# Patient Record
Sex: MALE | Race: OTHER | URBAN - METROPOLITAN AREA
[De-identification: names, ages, dates, MRNs, and addresses within clinical notes are randomized per-mention and may not be internally consistent; named-entity substitution may affect disease eponyms.]

---

## 2019-08-09 ENCOUNTER — INPATIENT (INPATIENT)
Facility: HOSPITAL | Age: 84
LOS: 1 days | Discharge: HOME | End: 2019-08-11
Attending: INTERNAL MEDICINE | Admitting: INTERNAL MEDICINE
Payer: MEDICARE

## 2019-08-09 VITALS
DIASTOLIC BLOOD PRESSURE: 82 MMHG | HEART RATE: 69 BPM | TEMPERATURE: 98 F | RESPIRATION RATE: 22 BRPM | OXYGEN SATURATION: 100 % | SYSTOLIC BLOOD PRESSURE: 146 MMHG

## 2019-08-09 DIAGNOSIS — Z87.39 PERSONAL HISTORY OF OTHER DISEASES OF THE MUSCULOSKELETAL SYSTEM AND CONNECTIVE TISSUE: Chronic | ICD-10-CM

## 2019-08-09 LAB
ALBUMIN SERPL ELPH-MCNC: 3.5 G/DL — SIGNIFICANT CHANGE UP (ref 3.5–5.2)
ALP SERPL-CCNC: 61 U/L — SIGNIFICANT CHANGE UP (ref 30–115)
ALT FLD-CCNC: 24 U/L — SIGNIFICANT CHANGE UP (ref 0–41)
ANION GAP SERPL CALC-SCNC: 18 MMOL/L — HIGH (ref 7–14)
AST SERPL-CCNC: 34 U/L — SIGNIFICANT CHANGE UP (ref 0–41)
BILIRUB SERPL-MCNC: 0.5 MG/DL — SIGNIFICANT CHANGE UP (ref 0.2–1.2)
BUN SERPL-MCNC: 15 MG/DL — SIGNIFICANT CHANGE UP (ref 10–20)
CALCIUM SERPL-MCNC: 8 MG/DL — LOW (ref 8.5–10.1)
CHLORIDE SERPL-SCNC: 108 MMOL/L — SIGNIFICANT CHANGE UP (ref 98–110)
CO2 SERPL-SCNC: 15 MMOL/L — LOW (ref 17–32)
CREAT SERPL-MCNC: 1 MG/DL — SIGNIFICANT CHANGE UP (ref 0.7–1.5)
GLUCOSE SERPL-MCNC: 141 MG/DL — HIGH (ref 70–99)
HCT VFR BLD CALC: 40 % — LOW (ref 42–52)
HGB BLD-MCNC: 13.5 G/DL — LOW (ref 14–18)
MAGNESIUM SERPL-MCNC: 1.8 MG/DL — SIGNIFICANT CHANGE UP (ref 1.8–2.4)
MCHC RBC-ENTMCNC: 30.4 PG — SIGNIFICANT CHANGE UP (ref 27–31)
MCHC RBC-ENTMCNC: 33.8 G/DL — SIGNIFICANT CHANGE UP (ref 32–37)
MCV RBC AUTO: 90.1 FL — SIGNIFICANT CHANGE UP (ref 80–94)
NRBC # BLD: 0 /100 WBCS — SIGNIFICANT CHANGE UP (ref 0–0)
NT-PROBNP SERPL-SCNC: 129 PG/ML — SIGNIFICANT CHANGE UP (ref 0–300)
PLATELET # BLD AUTO: 130 K/UL — SIGNIFICANT CHANGE UP (ref 130–400)
POTASSIUM SERPL-MCNC: 4 MMOL/L — SIGNIFICANT CHANGE UP (ref 3.5–5)
POTASSIUM SERPL-SCNC: 4 MMOL/L — SIGNIFICANT CHANGE UP (ref 3.5–5)
PROT SERPL-MCNC: 6.4 G/DL — SIGNIFICANT CHANGE UP (ref 6–8)
RBC # BLD: 4.44 M/UL — LOW (ref 4.7–6.1)
RBC # FLD: 14.7 % — HIGH (ref 11.5–14.5)
SODIUM SERPL-SCNC: 141 MMOL/L — SIGNIFICANT CHANGE UP (ref 135–146)
TROPONIN T SERPL-MCNC: <0.01 NG/ML — SIGNIFICANT CHANGE UP
WBC # BLD: 4.97 K/UL — SIGNIFICANT CHANGE UP (ref 4.8–10.8)
WBC # FLD AUTO: 4.97 K/UL — SIGNIFICANT CHANGE UP (ref 4.8–10.8)

## 2019-08-09 PROCEDURE — 99222 1ST HOSP IP/OBS MODERATE 55: CPT | Mod: 25

## 2019-08-09 PROCEDURE — 92941 PRQ TRLML REVSC TOT OCCL AMI: CPT | Mod: LD

## 2019-08-09 PROCEDURE — 99291 CRITICAL CARE FIRST HOUR: CPT

## 2019-08-09 PROCEDURE — 93458 L HRT ARTERY/VENTRICLE ANGIO: CPT | Mod: 26,XU

## 2019-08-09 PROCEDURE — 93010 ELECTROCARDIOGRAM REPORT: CPT

## 2019-08-09 RX ORDER — RANITIDINE HYDROCHLORIDE 150 MG/1
1 TABLET, FILM COATED ORAL
Qty: 0 | Refills: 0 | DISCHARGE

## 2019-08-09 RX ORDER — ACETAMINOPHEN 500 MG
650 TABLET ORAL EVERY 6 HOURS
Refills: 0 | Status: DISCONTINUED | OUTPATIENT
Start: 2019-08-09 | End: 2019-08-11

## 2019-08-09 RX ORDER — AMLODIPINE BESYLATE 2.5 MG/1
5 TABLET ORAL DAILY
Refills: 0 | Status: DISCONTINUED | OUTPATIENT
Start: 2019-08-09 | End: 2019-08-11

## 2019-08-09 RX ORDER — MORPHINE SULFATE 50 MG/1
2 CAPSULE, EXTENDED RELEASE ORAL ONCE
Refills: 0 | Status: DISCONTINUED | OUTPATIENT
Start: 2019-08-09 | End: 2019-08-09

## 2019-08-09 RX ORDER — PANTOPRAZOLE SODIUM 20 MG/1
40 TABLET, DELAYED RELEASE ORAL
Refills: 0 | Status: DISCONTINUED | OUTPATIENT
Start: 2019-08-09 | End: 2019-08-11

## 2019-08-09 RX ORDER — ALFUZOSIN HYDROCHLORIDE 10 MG/1
1 TABLET, EXTENDED RELEASE ORAL
Qty: 0 | Refills: 0 | DISCHARGE

## 2019-08-09 RX ORDER — METOPROLOL TARTRATE 50 MG
25 TABLET ORAL DAILY
Refills: 0 | Status: DISCONTINUED | OUTPATIENT
Start: 2019-08-09 | End: 2019-08-11

## 2019-08-09 RX ORDER — ATORVASTATIN CALCIUM 80 MG/1
80 TABLET, FILM COATED ORAL AT BEDTIME
Refills: 0 | Status: DISCONTINUED | OUTPATIENT
Start: 2019-08-09 | End: 2019-08-11

## 2019-08-09 RX ORDER — SODIUM CHLORIDE 9 MG/ML
750 INJECTION INTRAMUSCULAR; INTRAVENOUS; SUBCUTANEOUS
Refills: 0 | Status: DISCONTINUED | OUTPATIENT
Start: 2019-08-09 | End: 2019-08-11

## 2019-08-09 RX ORDER — TICAGRELOR 90 MG/1
180 TABLET ORAL ONCE
Refills: 0 | Status: COMPLETED | OUTPATIENT
Start: 2019-08-09 | End: 2019-08-09

## 2019-08-09 RX ORDER — LEVOTHYROXINE SODIUM 125 MCG
175 TABLET ORAL DAILY
Refills: 0 | Status: DISCONTINUED | OUTPATIENT
Start: 2019-08-09 | End: 2019-08-11

## 2019-08-09 RX ORDER — TICAGRELOR 90 MG/1
90 TABLET ORAL EVERY 12 HOURS
Refills: 0 | Status: DISCONTINUED | OUTPATIENT
Start: 2019-08-09 | End: 2019-08-11

## 2019-08-09 RX ORDER — ENOXAPARIN SODIUM 100 MG/ML
40 INJECTION SUBCUTANEOUS AT BEDTIME
Refills: 0 | Status: DISCONTINUED | OUTPATIENT
Start: 2019-08-10 | End: 2019-08-11

## 2019-08-09 RX ORDER — LISINOPRIL 2.5 MG/1
40 TABLET ORAL DAILY
Refills: 0 | Status: DISCONTINUED | OUTPATIENT
Start: 2019-08-09 | End: 2019-08-11

## 2019-08-09 RX ORDER — ASPIRIN/CALCIUM CARB/MAGNESIUM 324 MG
325 TABLET ORAL ONCE
Refills: 0 | Status: COMPLETED | OUTPATIENT
Start: 2019-08-09 | End: 2019-08-09

## 2019-08-09 RX ORDER — ASPIRIN/CALCIUM CARB/MAGNESIUM 324 MG
81 TABLET ORAL DAILY
Refills: 0 | Status: DISCONTINUED | OUTPATIENT
Start: 2019-08-09 | End: 2019-08-11

## 2019-08-09 RX ORDER — TICAGRELOR 90 MG/1
1 TABLET ORAL
Qty: 60 | Refills: 0
Start: 2019-08-09 | End: 2019-09-07

## 2019-08-09 RX ORDER — ENOXAPARIN SODIUM 100 MG/ML
40 INJECTION SUBCUTANEOUS AT BEDTIME
Refills: 0 | Status: DISCONTINUED | OUTPATIENT
Start: 2019-08-09 | End: 2019-08-09

## 2019-08-09 RX ADMIN — AMLODIPINE BESYLATE 5 MILLIGRAM(S): 2.5 TABLET ORAL at 21:11

## 2019-08-09 RX ADMIN — Medication 25 MILLIGRAM(S): at 20:29

## 2019-08-09 RX ADMIN — TICAGRELOR 90 MILLIGRAM(S): 90 TABLET ORAL at 20:28

## 2019-08-09 RX ADMIN — Medication 175 MICROGRAM(S): at 21:11

## 2019-08-09 RX ADMIN — PANTOPRAZOLE SODIUM 40 MILLIGRAM(S): 20 TABLET, DELAYED RELEASE ORAL at 21:11

## 2019-08-09 RX ADMIN — TICAGRELOR 180 MILLIGRAM(S): 90 TABLET ORAL at 10:23

## 2019-08-09 RX ADMIN — Medication 325 MILLIGRAM(S): at 10:20

## 2019-08-09 RX ADMIN — SODIUM CHLORIDE 75 MILLILITER(S): 9 INJECTION INTRAMUSCULAR; INTRAVENOUS; SUBCUTANEOUS at 12:21

## 2019-08-09 RX ADMIN — LISINOPRIL 40 MILLIGRAM(S): 2.5 TABLET ORAL at 21:11

## 2019-08-09 RX ADMIN — MORPHINE SULFATE 2 MILLIGRAM(S): 50 CAPSULE, EXTENDED RELEASE ORAL at 14:37

## 2019-08-09 RX ADMIN — ATORVASTATIN CALCIUM 80 MILLIGRAM(S): 80 TABLET, FILM COATED ORAL at 21:11

## 2019-08-09 NOTE — H&P ADULT - ASSESSMENT
83 y/o M w/ PMH of HTN, GERD, Melanoma, BPH, chronic back pain, and Pulmonary Fibrosis BIBEMS for chest pain. Code STEMI was called. Pt went to the cath lab. PCI showed 100% stenosis in LAD and was stented.     STEMI   - s/p PCI w/ LAD stent  - 83 y/o M w/ PMH of HTN, GERD, Melanoma, BPH, chronic back pain BIBEMS for chest pain. EKG showed ÁLVARO in V1-V3. Code STEMI was called. Pt went to the cath lab. PCI showed 100% stenosis in LAD and was stented.     STEMI   - admitted to CCU   - s/p ASA 325x1 and Brilinta 180x1   - s/p PCI w/ LAD stent  - Cardio follow   - c/w ASA, Brilinta, high dose Statin, BetaBlocker  - Daily EKG   - Monitor cath site R groin  - ordered ECHO   - Possible Staged PCI on monday   - NPO Sunday night     HTN   - keep systolic -150   - c/w norvasc   - c/w lisinopril     DLD  - c/w statin     GERD  - protonix bid     BPH   - home med not available   - currently urinating   - consider flomax if needed    Melanoma  - in remission   - pt takes herbal supplement bought online     DVT ppx; start lovenox tomorrow   GI ppx; protonix  Activity; Increase as tolerated  Diet; DASH   Dispo; CCU monitoring   FULL CODE

## 2019-08-09 NOTE — CONSULT NOTE ADULT - SUBJECTIVE AND OBJECTIVE BOX
Chief complaint:  Chest Pain    HPI:  85 yo M h/o HTN, melanoma has been c/o substernal cgest pain that started this AM, 20 mins prior to EMS arrival, brought to ER found to have anterior wall MI, STEMI activated, pt brought to cath lab for emergent PCI    ROS:  Constitutional: No fever, chill, sweats  Eye: No recent visual problem  ENMT: No ear pain, nasal congestion, throat pain  Respiratoty: No SOB, cough  Cardiovascular: + chest pain, no palpitaion, no syncope  Gastrointestinal: No nausea, vomitting, diarhea  Genitourinary: No dysuria, hematuria  Heam/Lymp: No brusing tendency, no swollen glands  Endocrine: Negative for excessive hunger, thirst  Musculoskeletal: No neck pain, back pain, joint pain  Intergumentory: No rash, skin lesions  Neurologic: alert and oriented    PAST MEDICAL & SURGICAL HISTORY  HTN    FAMILY HISTORY:  FAMILY HISTORY:  NC    SOCIAL HISTORY:  [-]smoker  [-]Alcohol  [-]Drug    ALLERGIES:  No Known Allergies      MEDICATIONS:  MEDICATIONS  (STANDING):  ticagrelor 180 milliGRAM(s) Oral once    MEDICATIONS  (PRN):      HOME MEDICATIONS:  Home Medications:      VITALS:   T(F): 97.7 (08-09 @ 10:28), Max: 97.7 (08-09 @ 10:17)  HR: 69 (08-09 @ 10:28) (69 - 69)  BP: 146/82 (08-09 @ 10:28) (146/82 - 146/82)  BP(mean): --  RR: 22 (08-09 @ 10:28) (22 - 22)  SpO2: 100% (08-09 @ 10:28) (100% - 100%)    I&O's Summary      PHYSICAL EXAM:  GEN: No acute distress  NECK: Supple, non tender, NO JVD, No carotid bruit,   LUNGS: Clear to auscultation bilaterally, non labored respiration  CARDIOVASCULAR: S1/S2 present, RRR , no murmus or rubs  ABD: Soft, non-tender, non-distended,   EXT: No Lower extremity edema, no tenderness  NEURO: Non focal  SKIN: Intact    LABS:                        13.5   4.97  )-----------( 130      ( 09 Aug 2019 10:00 )             40.0       ECG:  NSR, ST elevation in precordial leads Chief complaint:  Chest Pain    HPI:  85 yo M h/o HTN, melanoma has been c/o substernal cgest pain that started this AM, 20 mins prior to EMS arrival, brought to ER found to have anterior wall MI, STEMI activated, pt brought to cath lab for emergent PCI    ROS:  Constitutional: No fever, chill, sweats  Eye: No recent visual problem  ENMT: No ear pain, nasal congestion, throat pain  Respiratoty: No SOB, cough  Cardiovascular: + chest pain, no palpitaion, no syncope  Gastrointestinal: No nausea, vomitting, diarhea  Genitourinary: No dysuria, hematuria  Heam/Lymp: No brusing tendency, no swollen glands  Endocrine: Negative for excessive hunger, thirst  Musculoskeletal: No neck pain, back pain, joint pain  Intergumentory: No rash, skin lesions  Neurologic: alert and oriented    PAST MEDICAL & SURGICAL HISTORY  HTN    FAMILY HISTORY:  FAMILY HISTORY:  NC    SOCIAL HISTORY:  [-]smoker  [-]Alcohol  [-]Drug    ALLERGIES:  No Known Allergies      MEDICATIONS:  MEDICATIONS  (STANDING):  ticagrelor 180 milliGRAM(s) Oral once    MEDICATIONS  (PRN):      HOME MEDICATIONS:  Home Medications:      VITALS:   T(F): 97.7 (08-09 @ 10:28), Max: 97.7 (08-09 @ 10:17)  HR: 69 (08-09 @ 10:28) (69 - 69)  BP: 146/82 (08-09 @ 10:28) (146/82 - 146/82)  BP(mean): --  RR: 22 (08-09 @ 10:28) (22 - 22)  SpO2: 100% (08-09 @ 10:28) (100% - 100%)    I&O's Summary      PHYSICAL EXAM:  GEN: No acute distress  NECK: Supple, non tender, NO JVD, No carotid bruit,   LUNGS: Clear to auscultation bilaterally, non labored respiration  CARDIOVASCULAR: S1/S2 present, RRR , no murmus or rubs  ABD: Soft, non-tender, non-distended,   EXT/MS: No Lower extremity edema, no tenderness  NEURO: Non focal  SKIN: Intact    LABS:                        13.5   4.97  )-----------( 130      ( 09 Aug 2019 10:00 )             40.0       ECG:  NSR, ST elevation in precordial leads

## 2019-08-09 NOTE — H&P ADULT - NSHPLABSRESULTS_GEN_ALL_CORE
13.5   4.97  )-----------( 130      ( 09 Aug 2019 10:00 )             40.0       09 Aug 2019 10:00    141    |  108    |  15     ----------------------------<  141    4.0     |  15     |  1.0      Ca    8.0        09 Aug 2019 10:00  Mg     1.8       09 Aug 2019 10:00    TPro  6.4    /  Alb  3.5    /  TBili  0.5    /  DBili  x      /  AST  34     /  ALT  24     /  AlkPhos  61     09 Aug 2019 10:00            Serum Pro-Brain Natriuretic Peptide: 129 pg/mL (08-09-19 @ 10:00)    Troponin <0.01, CKMB --, CK -- 08-09-19 @ 10:00

## 2019-08-09 NOTE — ED PROVIDER NOTE - OBJECTIVE STATEMENT
83 y/o male with PMH of HTN and melanoma who presents to ED for chest pai9n. Pt was working outside when he had onset of pressure and pain in the center of his chest associated with diaphoresis. 911 was called and pt was treated with  and nitro x1 with no change in pain. On arrival to ED pt still c/o pain. No history of heart disease. 85 y/o male with PMH of HTN and melanoma who presents to ED for chest pain. Pt was working outside when he had onset of pressure and pain in the center of his chest associated with diaphoresis. 911 was called and pt was treated with  and nitro x1 with no change in pain. On arrival to ED pt still c/o pain. No history of heart disease.

## 2019-08-09 NOTE — ED ADULT NURSE NOTE - INTERVENTIONS DEFINITIONS
Monitor for mental status changes and reorient to person, place, and time/Review medications for side effects contributing to fall risk/[place] to call system/Instruct patient to call for assistance/Reinforce activity limits and safety measures with patient and family/Physically safe environment: no spills, clutter or unnecessary equipment/Stretcher in lowest position, wheels locked, appropriate side rails in place/Monitor gait and stability

## 2019-08-09 NOTE — CHART NOTE - NSCHARTNOTEFT_GEN_A_CORE
CARDIOLOGY NP:    Brilinta coverage confirmed , $28 copay, pt aware and agreeable , RX available for pick-up.  pt s/p STEMI/pci mLAD, pt for possible staged PCI of LCX on Monday, keep NPO p midnight Sunday.

## 2019-08-09 NOTE — ED ADULT NURSE NOTE - OBJECTIVE STATEMENT
Pt BIBA from home c/o sudden onset chest pain while doing yardwork. (+) ST elevations on EKG as per Paramedics. EMS notification received PTA for STEMI. Pt given ASA 81 mg PO x 2 doses & Nitroglycerin 00.4 mg SL x 1 dose by Paramedics in the field.

## 2019-08-09 NOTE — ED ADULT NURSE NOTE - CAS EDN DISCHARGE ASSESSMENT
Alert and oriented to person, place and time/Patient baseline mental status/Awake/No adverse reaction to first time med in ED

## 2019-08-09 NOTE — H&P ADULT - HISTORY OF PRESENT ILLNESS
85 y/o M w/ PMH of HTN, GERD, Melanoma, BPH, chronic back pain, and Pulmonary Fibrosis BIBEMS for chest pain that started in the AM. Pt states that he woke up w/ a chest discomfort and diaphoresis. The pain got progressively worse over the morning and got severe while he was working outside prompting the pt to call EMS. Pt describes it as a severe 10/10 chest pain in the center of his chest w/o radiation. Denies n/v. Denies any hx of chest pain.   EMS gave ASA 162mg and nitrox1 w/o pain relief.   In ED: EKG showed anterior wall MI. STEMI code was called. Given  and Brilinta 180 Pt went to the Cath lab.   Cath showed 2 vessel disease. 100% LAD and 90% distal LCx. LAD stented w/ SYNERGY. 85 y/o M w/ PMH of HTN, GERD, Melanoma, BPH, chronic back pain, BIBEMS for chest pain that started in the AM. Pt states that he woke up w/ a chest discomfort and diaphoresis. The pain got progressively worse over the morning and got severe while he was working outside prompting the pt to call EMS. Pt describes it as a severe 10/10 chest pain in the center of his chest w/o radiation. Denies n/v. Denies any hx of chest pain.   EMS gave ASA 162mg and nitrox1 w/o pain relief.   In ED: EKG showed anterior wall MI. ÁLVARO in V1-V3. STEMI code was called. Given  and Brilinta 180 Pt went to the Cath lab.   Cath showed 2 vessel disease. 100% LAD and 90% distal LCx. LAD stented w/ SYNERGY.

## 2019-08-09 NOTE — PATIENT PROFILE ADULT - DO YOU FEEL UNSAFE AT HOME, WORK, OR SCHOOL?
Principal Discharge DX:	Macromastia  Goal:	See below  Instructions for follow-up, activity and diet:	Follow the instructions given to you by Dr. Harrington
no

## 2019-08-09 NOTE — CONSULT NOTE ADULT - ASSESSMENT
Anterior wall MI s/p mid LAD PCI  CAD  HTN    CCU monitoring  ASA 81 mg q24h, Brilinta 90 mg q12h  Lipitor 80mg qhs  Lopressor 25 mg q12h  2D echo  EKG in AM  Right groin precaution

## 2019-08-09 NOTE — ED PROVIDER NOTE - PHYSICAL EXAMINATION
CONSTITUTIONAL: Well-developed; well-nourished; uncomfortable clutching chest  SKIN: warm, diaphoretic  HEAD: Normocephalic; atraumatic.  EYES: no conjunctival injection. PERRL.   ENT: No nasal discharge; airway clear.  NECK: Supple; non tender.  CARD: S1, S2 normal; no murmurs, gallops, or rubs. Regular rate and rhythm.   RESP: No wheezes, rales or rhonchi.  ABD: soft ntnd  EXT: Normal ROM.  No clubbing, cyanosis or edema.   LYMPH: No acute cervical adenopathy.  NEURO: Alert, oriented, grossly unremarkable  PSYCH: Cooperative, appropriate.

## 2019-08-09 NOTE — ED PROVIDER NOTE - NS ED ROS FT
Review of Systems:  •	CONSTITUTIONAL - No fever, No diaphoresis, No weight change  •	SKIN - No rash  •	HEMATOLOGIC - No abnormal bleeding or bruising  •	EYES - No eye pain, No blurred vision  •	ENT - No change in hearing, No sore throat, No neck pain, No rhinorrhea, No ear pain  •	RESPIRATORY - + shortness of breath, No cough  •	CARDIAC -+ chest pain, No palpitations  •	GI - No abdominal pain, No nausea, No vomiting, No diarrhea, No constipation, No bright red blood per rectum or melena. No flank pain  •                 - No dysuria, frequency, hematuria.   •	ENDO - No polydypsia, No polyuria, No heat/cold intolerance  •	MUSCULOSKELETAL - No joint paint, No swelling, No back pain  •	NEUROLOGIC - No numbness, No focal weakness, No headache, No dizziness  All other systems negative, unless specified in HPI

## 2019-08-09 NOTE — ED ADULT TRIAGE NOTE - CHIEF COMPLAINT QUOTE
EMS notification received PTA Re: STEMI, pt c/o sudden chest pain while doing yard work. EMS notification received PTA Re: STEMI, pt c/o sudden chest pain while doing yard work. Pt given ASA 81 mg PO x 2 doses & Nitroglycerin 0.4 mg SL x 1 dose by Paramedics in the field. STEMI Code activated PTA at 10:15 AM.

## 2019-08-09 NOTE — ED PROVIDER NOTE - PROGRESS NOTE DETAILS
stemi code on arrival to ED. Dr. Oropeza and cardio fellow at bedside. PT to go to cath lab. Patient to be admitted to an inpatient floor. Case discussed with and care endorsed to medical admitting resident. Admitting physician notified. Discussed with Monique, pts partner who was made aware. Patient to be admitted to an inpatient floor. Case discussed with and care endorsed to ICU resident. Admitting physician notified. I personally evaluated the patient. I reviewed the Resident’s or Physician Assistant’s note (as assigned above), and agree with the findings and plan except as documented in my note.   85 Y/O M HTN, UPPER LIP MELANOMA, GERD C/O CP SINCE LAST NIGHT. CP IS SUBSTERNAL AND NONRADIATING. CP WORSENED TODAY WITH EXERTION. CP IS 10/10. STEMI NOTED ON EKG BY EMS. + ASSOCIATED DIAPHORESIS. NO NAUSEA, DIZZINESS. NO ABD PAIN OR BACK PAIN. VITALS NOTED. ALERT OX3 MILD DISTRESS DUE TO PAIN. + DIAPHORESIS. NCAT PERRL. EOMI. OP NORMAL. MMM. NECK SUPPLE. LUNGS CLEAR B/L. RRR. S1S2. ABD- SOFT NONTENDER. NO LEG EDEMA. CN 2-12 INTACT. NEURO EXAM NONFOCAL.

## 2019-08-09 NOTE — H&P ADULT - NSHPPHYSICALEXAM_GEN_ALL_CORE
CONSTITUTIONAL: Well-developed; well-nourished; in no acute distress, speaking in full sentences  SKIN: warm, dry  HEAD: Normocephalic; atraumatic;  EYES: PERRL, EOMI, no conjunctival erythema  NECK: Supple; non tender, FROM  CARD: +S1, S2 Regular rate and rhythm. radial 2+  RESP: No wheezes, rales or rhonchi. CTABL  ABD: soft ntnd, no rebound, no guarding, no rigidity, neg murphys  EXT: moves all extremities, No clubbing, cyanosis or edema. Groin nontender nl pulses   NEURO: Alert, oriented, grossly unremarkable, no focal deficits  PSYCH: Cooperative, appropriate

## 2019-08-09 NOTE — ED ADULT NURSE NOTE - CHIEF COMPLAINT QUOTE
EMS notification received PTA Re: STEMI, pt c/o sudden chest pain while doing yard work. Pt given ASA 81 mg PO x 2 doses & Nitroglycerin 0.4 mg SL x 1 dose by Paramedics in the field. STEMI Code activated PTA at 10:15 AM.

## 2019-08-09 NOTE — CHART NOTE - NSCHARTNOTEFT_GEN_A_CORE
I have personally seen and examined the patient.  I agree with the history and physical which I have reviewed and noted any changes below.  08-09-19 @ 11:18    PRE-OP DIAGNOSIS: STEMI    PROCEDURE: [facility]    Physician: Dr Toussaint  Assistant: Dr Jose Ochoa, Dr Boyd    ANESTHESIA TYPE:  [  ]General Anesthesia  [  ] Sedation  [  x] Local/Regional    CONDITION  [  ] Critical  [  ] Serious  [  ]Fair  [ x ]Good      IV CONTRAST:     120        mL      FINDINGS    Left Heart Catheterization:  LVEF%: 35  LVEDP: mild elevation         LEFT HEART CATHERIZATION                                    Left main   mild disease    LAD:   ostial 30% lesion, Prox mild disease, Mid 100% acute lesion WANDA 0                     Diag: patent     Left Circumflex: Prox minor irregularities, Distal: 90% lesion   OM: normal     Right Cornary Artery: mild disease patent  RPDA patent      INTERVENTION  PCI to LAD SYNERGY 3x 16 mm     POST-OP DIAGNOSIS  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  I agree with the history and physical which I have reviewed and noted any changes below.  08-09-19 @ 11:18    PRE-OP DIAGNOSIS: STEMI    PROCEDURE: [facility]    Physician: Dr Toussaint  Assistant: Dr Jose Ochoa, Dr Boyd    ANESTHESIA TYPE:  [  ]General Anesthesia  [  ] Sedation  [  x] Local/Regional    CONDITION  [  ] Critical  [  ] Serious  [  ]Fair  [ x ]Good      IV CONTRAST:     120        mL      FINDINGS    Left Heart Catheterization:  LVEF%: 35  LVEDP: mild elevation         LEFT HEART CATHETERIZATION                                    Left main   mild disease    LAD:   ostial 30% lesion, Prox mild disease, Mid 100% acute lesion WANDA 0                     Diag: patent     Left Circumflex: Prox minor irregularities, Distal: 90% lesion   OM: normal     Right Coronary Artery: mild disease patent  RPDA patent      INTERVENTION  PCI to LAD SYNERGY 3x 16 mm     POST-OP DIAGNOSIS  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

## 2019-08-10 LAB
ANION GAP SERPL CALC-SCNC: 8 MMOL/L — SIGNIFICANT CHANGE UP (ref 7–14)
BASOPHILS # BLD AUTO: 0.03 K/UL — SIGNIFICANT CHANGE UP (ref 0–0.2)
BASOPHILS NFR BLD AUTO: 0.6 % — SIGNIFICANT CHANGE UP (ref 0–1)
BUN SERPL-MCNC: 10 MG/DL — SIGNIFICANT CHANGE UP (ref 10–20)
CALCIUM SERPL-MCNC: 7.8 MG/DL — LOW (ref 8.5–10.1)
CHLORIDE SERPL-SCNC: 106 MMOL/L — SIGNIFICANT CHANGE UP (ref 98–110)
CHOLEST SERPL-MCNC: 96 MG/DL — LOW (ref 100–200)
CO2 SERPL-SCNC: 24 MMOL/L — SIGNIFICANT CHANGE UP (ref 17–32)
CREAT SERPL-MCNC: 1.1 MG/DL — SIGNIFICANT CHANGE UP (ref 0.7–1.5)
EOSINOPHIL # BLD AUTO: 0.13 K/UL — SIGNIFICANT CHANGE UP (ref 0–0.7)
EOSINOPHIL NFR BLD AUTO: 2.5 % — SIGNIFICANT CHANGE UP (ref 0–8)
ESTIMATED AVERAGE GLUCOSE: 111 MG/DL — SIGNIFICANT CHANGE UP (ref 68–114)
GLUCOSE SERPL-MCNC: 101 MG/DL — HIGH (ref 70–99)
HBA1C BLD-MCNC: 5.5 % — SIGNIFICANT CHANGE UP (ref 4–5.6)
HCT VFR BLD CALC: 40.7 % — LOW (ref 42–52)
HDLC SERPL-MCNC: 34 MG/DL — LOW
HGB BLD-MCNC: 13.5 G/DL — LOW (ref 14–18)
IMM GRANULOCYTES NFR BLD AUTO: 0.4 % — HIGH (ref 0.1–0.3)
LIPID PNL WITH DIRECT LDL SERPL: 58 MG/DL — SIGNIFICANT CHANGE UP (ref 4–129)
LYMPHOCYTES # BLD AUTO: 1.18 K/UL — LOW (ref 1.2–3.4)
LYMPHOCYTES # BLD AUTO: 22.4 % — SIGNIFICANT CHANGE UP (ref 20.5–51.1)
MCHC RBC-ENTMCNC: 30.4 PG — SIGNIFICANT CHANGE UP (ref 27–31)
MCHC RBC-ENTMCNC: 33.2 G/DL — SIGNIFICANT CHANGE UP (ref 32–37)
MCV RBC AUTO: 91.7 FL — SIGNIFICANT CHANGE UP (ref 80–94)
MONOCYTES # BLD AUTO: 0.44 K/UL — SIGNIFICANT CHANGE UP (ref 0.1–0.6)
MONOCYTES NFR BLD AUTO: 8.3 % — SIGNIFICANT CHANGE UP (ref 1.7–9.3)
NEUTROPHILS # BLD AUTO: 3.47 K/UL — SIGNIFICANT CHANGE UP (ref 1.4–6.5)
NEUTROPHILS NFR BLD AUTO: 65.8 % — SIGNIFICANT CHANGE UP (ref 42.2–75.2)
NRBC # BLD: 0 /100 WBCS — SIGNIFICANT CHANGE UP (ref 0–0)
PLATELET # BLD AUTO: 139 K/UL — SIGNIFICANT CHANGE UP (ref 130–400)
POTASSIUM SERPL-MCNC: 4.5 MMOL/L — SIGNIFICANT CHANGE UP (ref 3.5–5)
POTASSIUM SERPL-SCNC: 4.5 MMOL/L — SIGNIFICANT CHANGE UP (ref 3.5–5)
RBC # BLD: 4.44 M/UL — LOW (ref 4.7–6.1)
RBC # FLD: 14.9 % — HIGH (ref 11.5–14.5)
SODIUM SERPL-SCNC: 138 MMOL/L — SIGNIFICANT CHANGE UP (ref 135–146)
TOTAL CHOLESTEROL/HDL RATIO MEASUREMENT: 2.8 RATIO — LOW (ref 4–5.5)
TRIGL SERPL-MCNC: 70 MG/DL — SIGNIFICANT CHANGE UP (ref 10–149)
TROPONIN T SERPL-MCNC: 0.97 NG/ML — CRITICAL HIGH
TROPONIN T SERPL-MCNC: 1.85 NG/ML — CRITICAL HIGH
WBC # BLD: 5.27 K/UL — SIGNIFICANT CHANGE UP (ref 4.8–10.8)
WBC # FLD AUTO: 5.27 K/UL — SIGNIFICANT CHANGE UP (ref 4.8–10.8)

## 2019-08-10 PROCEDURE — 99232 SBSQ HOSP IP/OBS MODERATE 35: CPT

## 2019-08-10 PROCEDURE — 93306 TTE W/DOPPLER COMPLETE: CPT | Mod: 26

## 2019-08-10 RX ADMIN — Medication 25 MILLIGRAM(S): at 06:13

## 2019-08-10 RX ADMIN — ENOXAPARIN SODIUM 40 MILLIGRAM(S): 100 INJECTION SUBCUTANEOUS at 21:20

## 2019-08-10 RX ADMIN — PANTOPRAZOLE SODIUM 40 MILLIGRAM(S): 20 TABLET, DELAYED RELEASE ORAL at 06:13

## 2019-08-10 RX ADMIN — ATORVASTATIN CALCIUM 80 MILLIGRAM(S): 80 TABLET, FILM COATED ORAL at 21:20

## 2019-08-10 RX ADMIN — TICAGRELOR 90 MILLIGRAM(S): 90 TABLET ORAL at 19:19

## 2019-08-10 RX ADMIN — TICAGRELOR 90 MILLIGRAM(S): 90 TABLET ORAL at 06:13

## 2019-08-10 RX ADMIN — Medication 81 MILLIGRAM(S): at 12:02

## 2019-08-10 RX ADMIN — LISINOPRIL 40 MILLIGRAM(S): 2.5 TABLET ORAL at 06:13

## 2019-08-10 RX ADMIN — AMLODIPINE BESYLATE 5 MILLIGRAM(S): 2.5 TABLET ORAL at 06:13

## 2019-08-10 RX ADMIN — Medication 175 MICROGRAM(S): at 06:13

## 2019-08-11 VITALS
HEART RATE: 72 BPM | RESPIRATION RATE: 16 BRPM | DIASTOLIC BLOOD PRESSURE: 79 MMHG | OXYGEN SATURATION: 98 % | SYSTOLIC BLOOD PRESSURE: 137 MMHG

## 2019-08-11 PROCEDURE — 99232 SBSQ HOSP IP/OBS MODERATE 35: CPT

## 2019-08-11 PROCEDURE — 99239 HOSP IP/OBS DSCHRG MGMT >30: CPT

## 2019-08-11 PROCEDURE — 93010 ELECTROCARDIOGRAM REPORT: CPT

## 2019-08-11 RX ORDER — ASPIRIN/CALCIUM CARB/MAGNESIUM 324 MG
1 TABLET ORAL
Qty: 30 | Refills: 0
Start: 2019-08-11 | End: 2019-09-09

## 2019-08-11 RX ORDER — LISINOPRIL 2.5 MG/1
1 TABLET ORAL
Qty: 0 | Refills: 0 | DISCHARGE
Start: 2019-08-11

## 2019-08-11 RX ORDER — LISINOPRIL 2.5 MG/1
1 TABLET ORAL
Qty: 0 | Refills: 0 | DISCHARGE

## 2019-08-11 RX ORDER — LEVOTHYROXINE SODIUM 125 MCG
1 TABLET ORAL
Qty: 0 | Refills: 0 | DISCHARGE
Start: 2019-08-11

## 2019-08-11 RX ORDER — TICAGRELOR 90 MG/1
1 TABLET ORAL
Qty: 0 | Refills: 0 | DISCHARGE
Start: 2019-08-11

## 2019-08-11 RX ORDER — AMLODIPINE BESYLATE 2.5 MG/1
1 TABLET ORAL
Qty: 0 | Refills: 0 | DISCHARGE

## 2019-08-11 RX ORDER — ASPIRIN/CALCIUM CARB/MAGNESIUM 324 MG
1 TABLET ORAL
Qty: 0 | Refills: 0 | DISCHARGE
Start: 2019-08-11

## 2019-08-11 RX ORDER — AMLODIPINE BESYLATE 2.5 MG/1
1 TABLET ORAL
Qty: 0 | Refills: 0 | DISCHARGE
Start: 2019-08-11

## 2019-08-11 RX ORDER — METOPROLOL TARTRATE 50 MG
1 TABLET ORAL
Qty: 0 | Refills: 0 | DISCHARGE
Start: 2019-08-11

## 2019-08-11 RX ORDER — LEVOTHYROXINE SODIUM 125 MCG
1 TABLET ORAL
Qty: 0 | Refills: 0 | DISCHARGE

## 2019-08-11 RX ORDER — LISINOPRIL 2.5 MG/1
1 TABLET ORAL
Qty: 30 | Refills: 0
Start: 2019-08-11 | End: 2019-09-09

## 2019-08-11 RX ORDER — TICAGRELOR 90 MG/1
1 TABLET ORAL
Qty: 60 | Refills: 0
Start: 2019-08-11 | End: 2019-09-09

## 2019-08-11 RX ORDER — ATORVASTATIN CALCIUM 80 MG/1
1 TABLET, FILM COATED ORAL
Qty: 0 | Refills: 0 | DISCHARGE
Start: 2019-08-11

## 2019-08-11 RX ORDER — METOPROLOL TARTRATE 50 MG
1 TABLET ORAL
Qty: 30 | Refills: 0
Start: 2019-08-11 | End: 2019-09-09

## 2019-08-11 RX ORDER — ATORVASTATIN CALCIUM 80 MG/1
1 TABLET, FILM COATED ORAL
Qty: 30 | Refills: 0
Start: 2019-08-11 | End: 2019-09-09

## 2019-08-11 RX ADMIN — AMLODIPINE BESYLATE 5 MILLIGRAM(S): 2.5 TABLET ORAL at 05:36

## 2019-08-11 RX ADMIN — TICAGRELOR 90 MILLIGRAM(S): 90 TABLET ORAL at 05:36

## 2019-08-11 RX ADMIN — Medication 81 MILLIGRAM(S): at 11:29

## 2019-08-11 RX ADMIN — PANTOPRAZOLE SODIUM 40 MILLIGRAM(S): 20 TABLET, DELAYED RELEASE ORAL at 06:00

## 2019-08-11 RX ADMIN — LISINOPRIL 40 MILLIGRAM(S): 2.5 TABLET ORAL at 05:36

## 2019-08-11 RX ADMIN — Medication 175 MICROGRAM(S): at 05:36

## 2019-08-11 RX ADMIN — Medication 25 MILLIGRAM(S): at 05:36

## 2019-08-11 NOTE — DISCHARGE NOTE NURSING/CASE MANAGEMENT/SOCIAL WORK - NSDCDPATPORTLINK_GEN_ALL_CORE
You can access the [facility] Patient Portal, offered by [facility], by registering with the following website: [email or web address]

## 2019-08-11 NOTE — PROGRESS NOTE ADULT - SUBJECTIVE AND OBJECTIVE BOX
Hospital Day:  2d    Subjective:    Pt was interviewed and examined at the bedside in the AM. No acute events overnight.       Denies headaches, changes in vision, chest pains, SOB, n/v/d, abd pain, constipation, changes in urination, pain on urination, weakness, fatigue.      Past Medical Hx:   Allergy history, drug  Melanoma  HTN (hypertension)    Past Sx:  History of knee problem    Allergies:  Percocet 2.5/325 (Unknown)  sulfa drugs (Rash; Flushing; Hives)    Current Meds:   Standng Meds:  amLODIPine   Tablet 5 milliGRAM(s) Oral daily  aspirin  chewable 81 milliGRAM(s) Oral daily  atorvastatin 80 milliGRAM(s) Oral at bedtime  enoxaparin Injectable 40 milliGRAM(s) SubCutaneous at bedtime  levothyroxine 175 MICROGram(s) Oral daily  lisinopril 40 milliGRAM(s) Oral daily  metoprolol succinate ER 25 milliGRAM(s) Oral daily  pantoprazole    Tablet 40 milliGRAM(s) Oral before breakfast  sodium chloride 0.9%. 750 milliLiter(s) (75 mL/Hr) IV Continuous <Continuous>  ticagrelor 90 milliGRAM(s) Oral every 12 hours    PRN Meds:  acetaminophen   Tablet .. 650 milliGRAM(s) Oral every 6 hours PRN Moderate Pain (4 - 6)    HOME MEDICATIONS:  alfuzosin 10 mg oral tablet, extended release: 1 tab(s) orally once a day  amLODIPine 5 mg oral tablet: 1 tab(s) orally once a day  aspirin 81 mg oral tablet, chewable: 1 tab(s) orally once a day  atorvastatin 80 mg oral tablet: 1 tab(s) orally once a day (at bedtime)  Fortesta 10 mg/actuation transdermal gel: 4 pump(s) transdermal once a day (in the morning)  levothyroxine 175 mcg (0.175 mg) oral tablet: 1 tab(s) orally once a day  lisinopril 40 mg oral tablet: 1 tab(s) orally once a day  metoprolol succinate 25 mg oral tablet, extended release: 1 tab(s) orally once a day  raNITIdine 150 mg oral tablet: 1 tab(s) orally once a day, As Needed      Vital Signs:   T(F): 98.1 (08-11-19 @ 02:00), Max: 98.1 (08-11-19 @ 02:00)  HR: 72 (08-11-19 @ 16:00) (62 - 82)  BP: 137/79 (08-11-19 @ 16:00) (112/65 - 166/94)  RR: 16 (08-11-19 @ 16:00) (16 - 35)  SpO2: 98% (08-11-19 @ 16:00) (95% - 98%)      08-10-19 @ 07:01  -  08-11-19 @ 07:00  --------------------------------------------------------  IN: 756 mL / OUT: 1900 mL / NET: -1144 mL    08-11-19 @ 07:01  -  08-11-19 @ 16:40  --------------------------------------------------------  IN: 1080 mL / OUT: 1150 mL / NET: -70 mL        Physical Exam:   CONSTITUTIONAL: Well-developed; well-nourished; in no acute distress, speaking in full sentences  	SKIN: warm, dry  	HEAD: Normocephalic; atraumatic;  	EYES: PERRL, EOMI, no conjunctival erythema  	NECK: Supple; non tender, FROM  	CARD: +S1, S2 Regular rate and rhythm. radial 2+  	RESP: No wheezes, rales or rhonchi. CTABL  	ABD: soft ntnd, no rebound, no guarding, no rigidity, neg murphys  	EXT: moves all extremities, No clubbing, cyanosis or edema. Groin nontender nl pulses   	NEURO: Alert, oriented, grossly unremarkable, no focal deficits  PSYCH: Cooperative, appropriate        Labs:                         13.5   5.27  )-----------( 139      ( 10 Aug 2019 03:20 )             40.7       10 Aug 2019 03:20    138    |  106    |  10     ----------------------------<  101    4.5     |  24     |  1.1      Ca    7.8        10 Aug 2019 03:20            Hemoglobin A1C, Whole Blood: 5.5 % (08-10-19 @ 03:20)    Serum Pro-Brain Natriuretic Peptide: 129 pg/mL (08-09-19 @ 10:00)    Troponin 0.97, CKMB --, CK -- 08-10-19 @ 16:00  Troponin 1.85, CKMB --, CK -- 08-10-19 @ 03:20  Troponin <0.01, CKMB --, CK -- 08-09-19 @ 10:00                Radiology:
Patient is a 84y old  Male who presents with a chief complaint of Chest Pain (10 Aug 2019 13:15)    HPI:  83 y/o M w/ PMH of HTN, GERD, Melanoma, BPH, chronic back pain, BIBEMS for chest pain that started in the AM. Pt states that he woke up w/ a chest discomfort and diaphoresis. The pain got progressively worse over the morning and got severe while he was working outside prompting the pt to call EMS. Pt describes it as a severe 10/10 chest pain in the center of his chest w/o radiation. Denies n/v. Denies any hx of chest pain.   EMS gave ASA 162mg and nitrox1 w/o pain relief.   In ED: EKG showed anterior wall MI. ÁLVARO in V1-V3. STEMI code was called. Given  and Brilinta 180 Pt went to the Cath lab.   Cath showed 2 vessel disease. 100% LAD and 90% distal LCx. LAD stented w/ SYNERGY. (09 Aug 2019 18:32)      SUBJ:  Patient seen and examined.      MEDICATIONS  (STANDING):  amLODIPine   Tablet 5 milliGRAM(s) Oral daily  aspirin  chewable 81 milliGRAM(s) Oral daily  atorvastatin 80 milliGRAM(s) Oral at bedtime  enoxaparin Injectable 40 milliGRAM(s) SubCutaneous at bedtime  levothyroxine 175 MICROGram(s) Oral daily  lisinopril 40 milliGRAM(s) Oral daily  metoprolol succinate ER 25 milliGRAM(s) Oral daily  pantoprazole    Tablet 40 milliGRAM(s) Oral before breakfast  sodium chloride 0.9%. 750 milliLiter(s) (75 mL/Hr) IV Continuous <Continuous>  ticagrelor 90 milliGRAM(s) Oral every 12 hours    MEDICATIONS  (PRN):  acetaminophen   Tablet .. 650 milliGRAM(s) Oral every 6 hours PRN Moderate Pain (4 - 6)            Vital Signs Last 24 Hrs  T(C): 36.7 (11 Aug 2019 02:00), Max: 36.7 (11 Aug 2019 02:00)  T(F): 98.1 (11 Aug 2019 02:00), Max: 98.1 (11 Aug 2019 02:00)  HR: 72 (11 Aug 2019 10:00) (63 - 82)  BP: 128/69 (11 Aug 2019 10:00) (107/58 - 166/94)  BP(mean): 107 (11 Aug 2019 10:00) (77 - 121)  RR: 35 (11 Aug 2019 10:00) (27 - 35)  SpO2: 96% (11 Aug 2019 10:00) (95% - 98%)      PHYSICAL EXAM:    GEN: AAO x 3, NAD  HEENT: NC/AT, PERRL  Neck: No JVD, no bruits  CV: Reg, S1-S2, no murmur  Lungs: CTAB  Abd: Soft, non-tender  Ext: No edema      I&O's Summary    10 Aug 2019 07:01  -  11 Aug 2019 07:00  --------------------------------------------------------  IN: 756 mL / OUT: 1900 mL / NET: -1144 mL    	    TELEMETRY:    ECG:    TTE:      LABS:                        13.5   5.27  )-----------( 139      ( 10 Aug 2019 03:20 )             40.7     08-10    138  |  106  |  10  ----------------------------<  101<H>  4.5   |  24  |  1.1    Ca    7.8<L>      10 Aug 2019 03:20      CARDIAC MARKERS ( 10 Aug 2019 16:00 )  x     / 0.97 ng/mL / x     / x     / x      CARDIAC MARKERS ( 10 Aug 2019 03:20 )  x     / 1.85 ng/mL / x     / x     / x                BNP  RADIOLOGY & ADDITIONAL STUDIES:      IMPRESSION AND PLAN:
Pt was not seen by a hospitalist on admission, I was called by a resident to see the pt.    VADIM KAPADIA    Patient is a 84y old  Male who presents with a chief complaint of Chest Pain (11 Aug 2019 15:57)    HPI:  83 y/o M w/ PMH of HTN, GERD, Melanoma, BPH, chronic back pain, BIBEMS for chest pain that started in the AM. Pt states that he woke up w/ a chest discomfort and diaphoresis. The pain got progressively worse over the morning and got severe while he was working outside prompting the pt to call EMS. Pt describes it as a severe 10/10 chest pain in the center of his chest w/o radiation. Denies n/v. Denies any hx of chest pain.   EMS gave ASA 162mg and nitrox1 w/o pain relief.   In ED: EKG showed anterior wall MI. ÁLVARO in V1-V3. STEMI code was called. Given  and Brilinta 180 Pt went to the Cath lab.   Cath showed 2 vessel disease. 100% LAD and 90% distal LCx. LAD stented w/ SYNERGY. (09 Aug 2019 18:32)    INTERVAL HPI/OVERNIGHT EVENTS: no events overnight, pt feels fine, no chest pain or SOB, ambulates freely.    ROS: All ROS negative    PHYSICAL EXAM:  T(C): 36.7, Max: 36.7 (08-11-19 @ 02:00)  HR: 72 (62 - 82)  BP: 137/79 (112/65 - 166/94)  RR: 16 (16 - 35)  SpO2: 98% (95% - 98%)    GENERAL: NAD  PULMONARY/CHEST: dry crackles  CARDIOVASC: Regular rate and rhythm; soft sytolic murmur  GI/ABDOMEN: Soft, Nontender, Nondistended; Bowel sounds present  EXTREMITIES:  2+ Peripheral Pulses, No clubbing, cyanosis, or edema, no deformity. No calf tenderness b/l.  NERVOUS SYSTEM:  Alert & Oriented X3, no focal deficit     LABS:                        13.5   5.27  )-----------( 139      ( 10 Aug 2019 03:20 )             40.7     08-10    138  |  106  |  10  ----------------------------<  101<H>  4.5   |  24  |  1.1    Ca    7.8<L>      10 Aug 2019 03:20      MEDICATIONS  (STANDING):  amLODIPine   Tablet 5 milliGRAM(s) Oral daily  aspirin  chewable 81 milliGRAM(s) Oral daily  atorvastatin 80 milliGRAM(s) Oral at bedtime  enoxaparin Injectable 40 milliGRAM(s) SubCutaneous at bedtime  levothyroxine 175 MICROGram(s) Oral daily  lisinopril 40 milliGRAM(s) Oral daily  metoprolol succinate ER 25 milliGRAM(s) Oral daily  pantoprazole    Tablet 40 milliGRAM(s) Oral before breakfast  sodium chloride 0.9%. 750 milliLiter(s) (75 mL/Hr) IV Continuous <Continuous>  ticagrelor 90 milliGRAM(s) Oral every 12 hours    MEDICATIONS  (PRN):  acetaminophen   Tablet .. 650 milliGRAM(s) Oral every 6 hours PRN Moderate Pain (4 - 6)
Patient is a 84y old  Male who presents with a chief complaint of Chest Pain (09 Aug 2019 18:32)    HPI:  83 y/o M w/ PMH of HTN, GERD, Melanoma, BPH, chronic back pain, BIBEMS for chest pain that started in the AM. Pt states that he woke up w/ a chest discomfort and diaphoresis. The pain got progressively worse over the morning and got severe while he was working outside prompting the pt to call EMS. Pt describes it as a severe 10/10 chest pain in the center of his chest w/o radiation. Denies n/v. Denies any hx of chest pain.   EMS gave ASA 162mg and nitrox1 w/o pain relief.   In ED: EKG showed anterior wall MI. ÁLVARO in V1-V3. STEMI code was called. Given  and Brilinta 180 Pt went to the Cath lab.   Cath showed 2 vessel disease. 100% LAD and 90% distal LCx. LAD stented w/ SYNERGY. (09 Aug 2019 18:32)      SUBJ:  Patient seen and examined.      MEDICATIONS  (STANDING):  amLODIPine   Tablet 5 milliGRAM(s) Oral daily  aspirin  chewable 81 milliGRAM(s) Oral daily  atorvastatin 80 milliGRAM(s) Oral at bedtime  enoxaparin Injectable 40 milliGRAM(s) SubCutaneous at bedtime  levothyroxine 175 MICROGram(s) Oral daily  lisinopril 40 milliGRAM(s) Oral daily  metoprolol succinate ER 25 milliGRAM(s) Oral daily  pantoprazole    Tablet 40 milliGRAM(s) Oral before breakfast  sodium chloride 0.9%. 750 milliLiter(s) (75 mL/Hr) IV Continuous <Continuous>  ticagrelor 90 milliGRAM(s) Oral every 12 hours    MEDICATIONS  (PRN):  acetaminophen   Tablet .. 650 milliGRAM(s) Oral every 6 hours PRN Moderate Pain (4 - 6)            Vital Signs Last 24 Hrs  T(C): 35.8 (10 Aug 2019 08:00), Max: 37 (09 Aug 2019 17:30)  T(F): 96.5 (10 Aug 2019 08:00), Max: 98.6 (09 Aug 2019 17:30)  HR: 76 (10 Aug 2019 12:00) (64 - 77)  BP: 132/73 (10 Aug 2019 12:00) (94/57 - 160/88)  BP(mean): 97 (10 Aug 2019 12:00) (71 - 116)  RR: 23 (10 Aug 2019 06:00) (17 - 33)  SpO2: 97% (10 Aug 2019 12:00) (96% - 99%)      PHYSICAL EXAM:    GEN: AAO x 3, NAD  HEENT: NC/AT, PERRL  Neck: No JVD, no bruits  CV: Reg, S1-S2, no murmur  Lungs: CTAB  Abd: Soft, non-tender  Ext: No edema      I&O's Summary    09 Aug 2019 07:01  -  10 Aug 2019 07:00  --------------------------------------------------------  IN: 825 mL / OUT: 1000 mL / NET: -175 mL    10 Aug 2019 07:01  -  10 Aug 2019 13:16  --------------------------------------------------------  IN: 388 mL / OUT: 1150 mL / NET: -762 mL    	    TELEMETRY: NSR    ECG:  < from: 12 Lead ECG (08.09.19 @ 18:56) >  Sinus rhythm with 1st degree A-V block  Right bundle branch block  Anteroseptal infarct , age undetermined  Abnormal ECG    < end of copied text >    TTE:  Pending    LABS:                        13.5   5.27  )-----------( 139      ( 10 Aug 2019 03:20 )             40.7     08-10    138  |  106  |  10  ----------------------------<  101<H>  4.5   |  24  |  1.1    Ca    7.8<L>      10 Aug 2019 03:20  Mg     1.8     08-09    TPro  6.4  /  Alb  3.5  /  TBili  0.5  /  DBili  x   /  AST  34  /  ALT  24  /  AlkPhos  61  08-09    CARDIAC MARKERS ( 10 Aug 2019 03:20 )  x     / 1.85 ng/mL / x     / x     / x      CARDIAC MARKERS ( 09 Aug 2019 10:00 )  x     / <0.01 ng/mL / x     / x     / x                BNP  RADIOLOGY & ADDITIONAL STUDIES:      IMPRESSION AND PLAN:

## 2019-08-11 NOTE — PROGRESS NOTE ADULT - ASSESSMENT
# CAD, s/p STEMI  - s/p cardiac cath: double vessel Dz CHAD to LAD, medical management of LCX  - Echo preserved LV funciton  - Medical therapy for LCX  - D/c home on DAPT, high dose statin, c/e metoprolol 25 mg.  - OP f/u with primary cardiology    # Pulm fibrosis - OP f/u with primary pulm    Pt is from [place] and going back there tomorrow AM.     D/c home today.
83 y/o M w/ PMH of HTN, GERD, Melanoma, BPH, chronic back pain BIBEMS for chest pain. EKG showed ÁLVARO in V1-V3. Code STEMI was called. Pt went to the cath lab. PCI showed 100% stenosis in LAD and was stented.     STEMI   - admitted to CCU   - s/p ASA 325x1 and Brilinta 180x1   - s/p PCI w/ LAD stent  - Cardio follow   - c/w ASA, Brilinta, high dose Statin, BetaBlocker  - EKG   - Monitor cath site R groin  - ECHO EF - 67%  - Cleared by cardio for d/c    HTN   - keep systolic -150   - c/w norvasc   - c/w lisinopril     DLD  - c/w statin     GERD  - protonix bid     BPH   - home med not available   - currently urinating   - consider flomax if needed    Melanoma  - in remission   - pt takes herbal supplement bought online     DVT ppx; lovenox  GI ppx; protonix  Activity; Increase as tolerated  Diet; DASH   Dispo; home  FULL CODE
S/p STEMI  Echo noted. Overall preserved LV funciton  Medical therapy for LCX  D/c home on DAPT, high dose statin, c/e metoprolol 25 mg.  C/w other outpatient medications.  Patient will f/u with his cardiologist in [place]. Direct number to our office provided in case of any questions.  D/c home today.
LAD related MI, s/p primary PCI  No angina or clinical chf  feels good    Plan: C/w DAPT  C/w BB, statin, ACE-I  2D echo today.  If stable - d/c home tomorrow.

## 2019-08-11 NOTE — DISCHARGE NOTE PROVIDER - NSDCCPCAREPLAN_GEN_ALL_CORE_FT
PRINCIPAL DISCHARGE DIAGNOSIS  Diagnosis: STEMI (ST elevation myocardial infarction)  Assessment and Plan of Treatment:

## 2019-08-11 NOTE — DISCHARGE NOTE PROVIDER - HOSPITAL COURSE
85 y/o M w/ PMH of HTN, GERD, Melanoma, BPH, chronic back pain, BIBEMS for chest pain that started in the AM. Pt states that he woke up w/ a chest discomfort and diaphoresis. The pain got progressively worse over the morning and got severe while he was working outside prompting the pt to call EMS. Pt describes it as a severe 10/10 chest pain in the center of his chest w/o radiation. Denies n/v. Denies any hx of chest pain.     EMS gave ASA 162mg and nitrox1 w/o pain relief.     In ED: EKG showed anterior wall MI. ÁLVARO in V1-V3. STEMI code was called. Given  and Brilinta 180 Pt went to the Cath lab.     Cath showed 2 vessel disease. 100% LAD and 90% distal LCx. LAD stented w/ SYNERGY. Cath site was monitored. No bleeding, no tenderness.    Pt was started on DAPT, High dose statin, Beta blocklers and ACEi. Pt followed post cath precautions. 2D Echo showed EF of 67%. Pt stable and ready for discharge.

## 2019-08-11 NOTE — DISCHARGE NOTE NURSING/CASE MANAGEMENT/SOCIAL WORK - NSDCPEEMAIL_GEN_ALL_CORE
[facility] email [email or web address]

## 2019-08-11 NOTE — DISCHARGE NOTE PROVIDER - CARE PROVIDER_API CALL
Jamarcus Tavarez (MD)  Cardiovascular Disease; Internal Medicine; Interventional Cardiology  [address] [place] [address]  Phone: [phone]  Fax: [phone]  Follow Up Time:

## 2019-08-11 NOTE — DISCHARGE NOTE PROVIDER - NSDCFUADDAPPT_GEN_ALL_CORE_FT
During this hospital admission, you were diagnosed with a myocardial infarction (MI), or a heart attack. A heart attack happens when the blood vessels that supply blood to your heart (coronary arteries) are blocked. This can damage your heart, and can lead to an abnormal heart rhythm, heart failure, or may become life-threatening.    Please take your medications as prescribed. Increase activity as tolerated.     Eat a heart healthy diet to protect the heart, including plenty of fruits and vegetables, nuts, and sources of fiber.Do not smoke. If you smoke, it is never too late to quit. Smoking increases your risk of another MI. Try to exercise 30 to 60 minutes a day, 5 to 7 days a week. Maintain a healthy weight. Manage your stress. Stress may slow healing and lead to illness. Learn ways to control stress, such as relaxation, deep breathing, and music. Get a flu vaccine every year as soon as it is available    Seek immediate medical attention for new or worsening chest pain or shortness of breath. Please for the cath site precautions as explained by the cardiologist. Please follow up with your cardiologist in [place] in 1-2 weeks.

## 2019-08-11 NOTE — DISCHARGE NOTE NURSING/CASE MANAGEMENT/SOCIAL WORK - NSDCPEWEB_GEN_ALL_CORE
[facility] for Tobacco Control website --- [email or web address] website --- [email or web address]

## 2019-08-16 DIAGNOSIS — N40.0 BENIGN PROSTATIC HYPERPLASIA WITHOUT LOWER URINARY TRACT SYMPTOMS: ICD-10-CM

## 2019-08-16 DIAGNOSIS — Z85.820 PERSONAL HISTORY OF MALIGNANT MELANOMA OF SKIN: ICD-10-CM

## 2019-08-16 DIAGNOSIS — Z79.82 LONG TERM (CURRENT) USE OF ASPIRIN: ICD-10-CM

## 2019-08-16 DIAGNOSIS — I10 ESSENTIAL (PRIMARY) HYPERTENSION: ICD-10-CM

## 2019-08-16 DIAGNOSIS — I25.10 ATHEROSCLEROTIC HEART DISEASE OF NATIVE CORONARY ARTERY WITHOUT ANGINA PECTORIS: ICD-10-CM

## 2019-08-16 DIAGNOSIS — M54.9 DORSALGIA, UNSPECIFIED: ICD-10-CM

## 2019-08-16 DIAGNOSIS — J84.10 PULMONARY FIBROSIS, UNSPECIFIED: ICD-10-CM

## 2019-08-16 DIAGNOSIS — K21.9 GASTRO-ESOPHAGEAL REFLUX DISEASE WITHOUT ESOPHAGITIS: ICD-10-CM

## 2019-08-16 DIAGNOSIS — G89.29 OTHER CHRONIC PAIN: ICD-10-CM

## 2019-08-16 DIAGNOSIS — Z88.5 ALLERGY STATUS TO NARCOTIC AGENT: ICD-10-CM

## 2019-08-16 DIAGNOSIS — Z88.2 ALLERGY STATUS TO SULFONAMIDES: ICD-10-CM

## 2019-08-16 DIAGNOSIS — I21.02 ST ELEVATION (STEMI) MYOCARDIAL INFARCTION INVOLVING LEFT ANTERIOR DESCENDING CORONARY ARTERY: ICD-10-CM

## 2019-08-16 DIAGNOSIS — Z79.02 LONG TERM (CURRENT) USE OF ANTITHROMBOTICS/ANTIPLATELETS: ICD-10-CM

## 2025-02-10 NOTE — ED ADULT NURSE NOTE - NS ED NURSE DISCH DISPOSITION
[facility]  Progress Note    Attending Physician: Abiodun Cole MD   PCP: Catherine Rapp MD     Source of information: Self, chart review, Handoff  Reason for admission: Acute on Chronic Hypoxemic Respiratory Failure   Date of admission: 2/9/25   POA: Unknown    H&P     Mik Doe is a 71 year old female with PMH of Asthma, COPD, SLE, RA, CAD s/p 2 overlapping JEFFERY LM to ostial LAD (2014; on plavix and eliquis), HFpEF, Afib, HTN, HLD, Pulmonary HTN, CKD3, Lupus nephritis, Acquired Hypothyroidism, T2DM, Osteoporosis, GERD, Restless Leg Syndrome, recent NSTEMI (11/'24), recent Influenza A infection with Asthma exacerbation (12/'24), who presented to [facility] ED with progressive dyspnea, chest tightness, and worsening cough with white sputum since 0200 this AM after working a double shift at her job at a [employer]. Of note, patient recently had her dose of oral prednisone increased from 5 mg -> 60 mg daily in last week for suspected SLE flares (with 2x weeks fever and vomiting). She had increasing O2 requirements in the ED and was placed on BIPAP and broad spectrum ABX therapy was  initiated. Pt transferred to [facility] for continued HD monitoring and BiPAP management.         ED Course:  - Vitals: 182/63, , RR 24, SpO2 95%. On repeat (1Hr after arrival) /73, HR 95, RR 33, SpO2 99%   - EKG: Unable to obtain d/t patient's shivering   - Labs: Covid/Flu/RSV negative. CBC with WBC 12.2, Hgb 10.7. Trop 67 -> 94, NT proBNP 3759. CMP with electrolytes WNL, Crt 1.07, BUN 26, AST 38, ALT 20, Albumin 3.1, Glucose 149. Mag 2. PT 10.3, INR 1.0, PTT 25. Procal 0.12. LA 5.4 -> 1.3.  ABG pH 7.43 / CO2 35 / O2 40 / HCO3 23 on Bipap 17/7 FiO2 100%  - Imaging: CXR with Borderline appearing heart size. Coarse irregular prominence of bronchovascular and interstitial markings with peribronchial opacities and bilateral airspace opacities worsened compared to prior study.  - Intervention: Duoneb, Vanc, Cefepime, placed on  BiPAP      MICCU PROGRESS     Day 0 (2/9/2025):  - Pt arrives to MICCU tachypneic (25-35) on Bipap 15/7 FiO2 60%; other VSS  - Cefepime, Vanc, Doxy for empiric PNA coverage iso chronic immunosuppressive therapy   - Started Hydrocortisone 50 mg q6     Day 1 (2/10//2025):  - This AM pt ate breakfast but with persistent difficulty breathing.  - Patient was on high flow (weaned overnight). Put back on BiPAP today > Saturating 74% on BiPAP max oxygen > patient intubated by Anesthesia.   - VBG: pH 7.30 / pCO2 40 / pO2 31, HCO3 20  - Started on Levophed and Vasopressin.  MAP goal 55  - Started propofol and Fentanyl  - On MV: 450/ FiO2 85%/ RR 16 Peep 12  - New CXR: New endotracheal tube with tip 3 cm above the jonathan. Diffuse bilateral lung opacities slightly improved.  - Order PJP sputum, LDH, Beta D glucan  - ID consulted. Started on Clindamycin 900mg TID and Primaquine 30 mg QD  - Increased Lasix 40 mg IV BID to TID  - Paralyzing and proning on hold due to hemodynamic instability  - No chest CT overnight. Probably tomorrow       ASSESSMENT AND PLAN     Mik Doe is a 71 year old female with PMH of Asthma, COPD, SLE, RA, CAD s/p 2 overlapping JEFFERY LM to ostial LAD (2014; on plavix and eliquis), HFpEF, Afib, HTN, HLD, Pulmonary HTN, CKD3, Lupus nephritis, Acquired Hypothyroidism, T2DM, Osteoporosis, GERD, Restless Leg Syndrome, recent NSTEMI (11/'24), recent Influenza A infection with Asthma exacerbation (12/'24), who presented to [facility] ED with progressive dyspnea, chest tightness, and worsening cough with white sputum since 0200 2/9/2025 after working a double shift at her job at a [employer]. Admitted to [facility] for Hypoxemic respiratory failure and concern for CAP.      PULMONARY:  #ARSD  #Asthma Exacerbation   #Acute on Chronic Hypoxemic Respiratory failure   #Respiratory Alkalosis  #C/f CAP iso chronic immunosuppressive therapy   #C/f pulmonary edema   #Tachypnea  - PTA meds: Fluticasone-salmeterol 500-50 mcg 1 puff  BID, albuterol neb and inhaler, montelukast 10 mg nightly   - CXR on arrival: Borderline appearing heart size. Coarse irregular prominence of bronchovascular and interstitial markings with peribronchial opacities and bilateral airspace opacities worsened compared to prior study.  - Labs: Covid/Flu/RSV negative. RRP negative. MRSA neg. Procal 0.12. LA 5.4 -> 1.3.  VBG pH 7.43 / CO2 35 / O2 40 / HCO3 23 on Bipap 17/7 FiO2 100%  - Was on BiPAP 15/7 FiO2 60%, minute ventilation ~22L/min. Pt unable to tolerate breaks in BiPAP for Oral meds 2/2 desaturations               - ABG: pH 7.57 / CO2 29 / O2 90 / HCO3 27 / BE 5 / Sat 98%   - Strep pneumo and Legionella negative  - New CXR 2/10/25: New endotracheal tube with tip 3 cm above the jonathan. Diffuse bilateral lung opacities slightly improved.  - S/p Vancomycin 2/9/25  - Blood culture: NG1D  -   - New CXR 2/10/25: New endotracheal tube with tip 3 cm above the jonathan. Diffuse bilateral lung opacities slightly improved.    Plan:   - Currently intubated  - Continue Cefepime (2/9-), Doxy (2/9) for empiric PNA coverage iso chronic immunosuppressive therapy   - Order PJP sputum, LDH, Beta D glucan  - ID consulted. Started on Clindamycin 900mg TID and Primaquine 30 mg QD. PJP prophylaxis  - F/u PJP sputum, Beta D glucan  - CT Chest likely tomorrow  - Hydrocortisone 50 mg q6   - Fluticasone-vilanterol 1 puff daily with RRT   - q4 Duonebs   - Resume PTA montelukast when able to tolerate PO intake off BIPAP   - F/u Blood Culture.   - F/u Sputum Culture        CARDIOVASCULAR:  #HFpEF exacerbation   - AHRF suspect 2/2 to ADHF  - PTA meds: Metoprolol Succinate 25 mg daily, Jardiance 25 mg daily.   -Recently taken off furosemide 20 mg daily by PCP.   - TTE 11/14/2024: LVCH, vigorous systolic function (EF 65%), NRWMA's. LA mildly to moderately dilated. Normal RV systolic function with mildly increased Systolic pressure 35 mmHg. Small, free-flowing pericardial effusion anterior to  heart.  - Cardiac MRI 11/26/2024: LV: Normal in size and systolic function.  Moderate focal basal hypertrophy of the septum.  Calculated LVEF is 70%. RV: Normal in size and systolic function.  Calculated RVEF is 64%. Trivial pericardial effusion. There is no evidence of myocardial inflammation, infiltration, or fibrosis.  - Labs on arrival: NT proBNP 3759, TSH WNL   - Pt appears volume overloaded on CXR and physical exam (1+ pitting edema in BL LE's)     Plan:   - Increase Lasix 40 mg IV BID to TID  - F/u TTE  - Holding PTA metoprolol succinate 25 mg daily   - Hold jardiance while in-patient   - CTM UOP and Is/Os     #CAD s/p 2x overlapping JEFFERY (LM to ostial LAD in 2014)  #HLD  #Troponemia   - likely elevated 2/2 demand ischemia iso HFpEF exacerbation   - PTA meds: Atorvastatin 80 mg daily, ezetimibe 10 mg daily, Plavix 75 mg daily, evolocumab 140 mg every 14 days  - [facility] 11/14/24: Moderate stenosis in distal LM to ostial LAD. Moderate stenosis of proximal RCA. Diffusely calcified and diseased RCA with a Severe 80% calcific focal stenosis in distal RCA that is chronic.   - Troponin 67 -> 94 > 113 > 76  - Lipid panel 2/9: Cholesterol 99, HDL 46, LDL 20,   - ECG 2/9: NSR with T wave inversions in leads I, II, V2-6 (seen on prior ECG). No ST elevations/depressions   - Pt denies CP at this time     Plan:   - Resume PTA plavix, atorvastatin, and ezetimibe when able to tolerate PO Meds off BiPAP   - STAT ECG for CP or changes in Hemodynamics      #Pulmonary Hypertension, unspecified group   - [facility] 11/14/24: RV pressures 39/22/25, Pulmonary Artery pressure 39/30/34, PCWP 30/30/28, Transpulmonary gradient 6. Dialstolic pulmonary gradient 2. RAJEEV 0.6.      #Afib, on eliquis  - PTA meds: eliquis 5 mg BID, Metoprolol Succinate 25 mg daily   - ECG 2/9: NSR with T wave inversions in leads I, II, V2-6 (seen on prior ECG). No ST elevations/depressions      Plan:   - Hold Eliquis   - Holding Metoprolol succinate     #HTN  - PTA  meds: Metoprolol Succinate 25 mg daily  - Pt Hypertensive initially on arrival (likely 2/2 respiratory distress) and returned to normotension without any BP meds     Plan:   - CTM BP   - Holding Metoprolol Succinate 25 mcg daily      RENAL /  / Electrolytes:  #CKD3   #Lupus Nephritis   #Volume overload   - Baseline Crt ~1.1  - Initial Labs: CMP with electrolytes WNL, Crt 1.07, BUN 26  - Patient appears volume overloaded on PE. Good UOP in response to initial lasix 40 mg IV     Plan:   - Increased Lasix 40 mg IV BID to TID  - Q12 BMP and Mag   - CTM UOP and Is/Os.      GI:  #Mildly Elevated LFTs   - AST 38 on arrival   - Likely 2/2 hepatic congestion iso volume overload      #GERD  - pt takes pantoprazole 40 mg daily at home      Plan:   - Currently intubated. Resume PTA pantoprazole when able to tolerate oral intake.  - BR: prn miralax, senokot, and maalox    NEURO / PSYCH:  #Restless Leg Syndrome   - Resume PTA pramipexole when able to tolerate PO     Pain regimen: Acetaminophen suppository 325 mg q4 prn, Tramadol 50 mg q6 prn      HEME / ONC:  #Chronic Anemia   - Likely secondary to chronic disease burden   - Baseline Hgb ~10  - Hg 10.7 on arrival      Plan:   - CTM daily CBC  - Transfuse for Hgb <8   - Holding Home Eliquis   - Continue Therapeutic Lovenox 70 mg BID      ENDOCRINE / METABOLIC:  #T2DM   - last HbA1c 6.6 (12/9/24)  - PTA meds: Jardiance 25 mg daily  -      Plan:   - On SSI  - CTM BG  - Hold PTA jardiance while in-patient      #Lactic Acidosis (resolved)   - Secondary to volume overload and respiratory failure  - lactate trend: 5.4 -> 1.3     Plan:   - No need to further trend lactate unless patient becomes acidotic      #Hypothyroidism   - TSH WNL   - PTA levothroxine 100 mcg daily      Plan:   - Resume PTA levothroxine 100 mcg daily when patient able to tolerate PO intake        RHEUMATOLOGIC:   #Systemic Lupus Erythematosus   #Rheumatoid Arthritis   - Pt follows Dr. Naik OP Rheumatology    - Pt reports suspected SLE flare over last two weeks with fever and vomiting   - PTA meds: Prednisone 5 mg (dose increased to 60 mg last week by her iso SLE flare), Mycophenolate 1g BID     Plan:  - Hydrocortisone 50 mg IV q6   - Resume PTA Mycophenolate 1g BID when able to tolerate PO meds     ID:  #C/f CAP (see Pulm)  #Chronic Immunosuppression therapy   #Leukocytosis  - See details for CAP workup in \"Pulm\"  - Prednisone 5 mg (dose increased to 60 mg last week by her iso SLE flare), Mycophenolate 1g BID  - Pt Afebrile on arrival, WBC 12.2  - Strep pneumo, Legionella: negative  - s/p Vancomycin 2/9/25     Plan:   - Continue Cefepime, Doxy for empiric PNA coverage iso chronic immunosuppressive therapy.  - F/u Blood Culture   - F/u Sputum Culture  - CTM for fever   - CTM daily CBC     DERM / MSK:  #Osteoarthritis   #Osteopenia   - Pt does not complain of any pain at this time  - See \"Neuro\" for Pain regimen         CHECKLIST:  F: None.  E: Replete PRN for K>4, Mg>2  N: NPO    DVT ppx: on Therapeutic Lovenox 70 mg BID   GI ppx: protonix 40 mg PO daily (resume when pt can tolerate PO)   Bowel regimen: prn miralax, senokot, and maalox   Lines/drains/airways: Oral ETT, 4x PIV, L femoral arterial line. OG tube    CODE STATUS:   Code Status: Full Resuscitation    DISPOSITION: MICCU    CONSULTS:       Discussed with [facility] Dr. Lomas and attending physician Dr. Nieto. Please see attending physician addendum/note for final recommendations.      OBJECTIVE      VITAL SIGNS   Vital Last Value 24 Hour Range   Temperature 98.4 °F (36.9 °C) (02/10/25 0400) Temp  Min: 98.4 °F (36.9 °C)  Max: 102.6 °F (39.2 °C)   Pulse 91 (02/10/25 0500) Pulse  Min: 74  Max: 107   Respiratory (!) 34 (02/10/25 0500) Resp  Min: 11  Max: 34   Non-Invasive  Blood Pressure 121/55 (02/10/25 0500) BP  Min: 95/60  Max: 182/63   Pulse Oximetry 95 % (02/10/25 0500) SpO2  Min: 89 %  Max: 100 %     INTAKE/OUTPUT    Intake/Output Summary (Last 24 hours)  at 2/10/2025 0640  Last data filed at 2/10/2025 0500  Gross per 24 hour   Intake 1283.65 ml   Output 5700 ml   Net -4416.35 ml       PHYSICAL EXAM  Physical Exam  Constitutional:       General: She is in acute distress.   HENT:      Head: Normocephalic.      Neck: No tenderness.   Eyes:      General: No scleral icterus.        Right eye: No discharge.         Left eye: No discharge.   Cardiovascular:      Rate and Rhythm: Tachycardia present.      Pulses: Normal pulses.   Pulmonary:      Effort: Respiratory distress present.      Comments: Pt currently intubated  Abdominal:      Palpations: Abdomen is soft.      Tenderness: There is no abdominal tenderness.   Musculoskeletal:      Right lower leg: Edema present.      Left lower leg: Edema present.      Comments: Trace bilateral LE edema   Neurological:      Comments: Currently intubated          LABS  Recent Labs     02/09/25  0733 02/10/25  0309   WBC 12.2* 10.8   RBC 3.57* 3.19*   HGB 10.7* 9.6*   HCT 35.1* 31.3*   MCV 98.3 98.1   MCH 30.0 30.1   MCHC 30.5* 30.7*    164       Recent Labs     02/09/25  0733 02/09/25  1805 02/09/25  2135 02/10/25  0309   SODIUM 139  --  139 142   POTASSIUM 3.5  --  4.2 3.4   CHLORIDE 102  --  102 106   CO2 23  --  27 23   ANIONGAP 18  --  14 16   BUN 26*  --  32* 29*   CREATININE 1.07*  --  1.41* 1.02*   GLUCOSE 149*  --  284* 156*   CALCIUM 10.5*  --  10.2 8.6   MG 2.0 1.8  --  1.9   ALBUMIN 3.1*  --   --   --    BILIRUBIN 0.9  --   --   --    ALKPT 97  --   --   --    AST 38*  --   --   --        Recent Labs     02/09/25  0733   PT 10.3   PTT 25   INR 1.0   TOTPROTEIN 7.0   ALBUMIN 3.1*   BILIRUBIN 0.9   ALKPT 97   AST 38*   GPT 20       Recent Labs     02/09/25  0733   NTPROB 3,759*       Recent Labs   Lab 02/09/25  1049   CHOLESTEROL 99   TRIGLYCERIDE 166*   HDL 46*   CALCLDL 20       Last Lab A1C:  Hemoglobin A1C (%)   Date Value   12/09/2024 6.6 (H)       Last Point of Care A1C:  Hemoglobin A1C, POC (%)   Date Value    01/10/2024 7.1 (A)       Lab Results   Component Value Date    UWBC Negative 02/03/2025    UWBC SMALL (A) 02/04/2020    URBC Negative 02/03/2025    URBC NEGATIVE 02/04/2020       MICRO  Urine Culture:  No components found for: \"CURINE\"  Blood Culture:  No components found for: \"CBLOOD\", \"CFUNGUSBL\"    IMAGING  XR CHEST AP OR PA    Result Date: 2/9/2025  EXAM: XR CHEST AP OR PA CLINICAL INDICATION: Acute hypoxic respiratory failure. COMPARISON: Earlier the same day.     FINDINGS AND IMPRESSION: Interval worsening of confluent patchy airspace opacities bilaterally secondary to worsening edema pattern. Close infectious process excluded. Correlate clinically. No pleural effusion or pneumothorax. Heart size stable. Evidence of prior vertebroplasty. Electronically Signed by: STEPH HENRY M.D. Signed on: 2/9/2025 2:40 PM Workstation ID: [id number]    XR CHEST PA OR AP 1 VIEW    Result Date: 2/9/2025  EXAM: XR CHEST AP OR PA  CLINICAL INDICATION: Shortness of breath, chest pain  COMPARISON: X-ray chest PA and lateral 2 views February 3, 2025      FINDINGS/IMPRESSION: Borderline appearing heart size. Coarse irregular prominence of bronchovascular and interstitial markings with peribronchial opacities and bilateral airspace opacities worsened compared to prior study. Infectious process is possible. Inflammatory process or other etiology not excluded.  Dictated by: ROSEANN MCKEON MD Dictated on: 2/9/2025 7:39 AM MAGDALENA STEPHENS M.D., have reviewed the images and report and concur with these findings interpreted by ROSEANN MCKEON MD. Electronically Signed by: MAGDALENA SHEIKH M.D. Signed on: 2/9/2025 8:42 AM Workstation ID: [id number]      EKG    Encounter Date: 02/09/25   Electrocardiogram 12-Lead   Result Value    Ventricular Rate EKG/Min (BPM) 84    Atrial Rate (BPM) 84    IN-Interval (MSEC) 168    QRS-Interval (MSEC) 82    QT-Interval (MSEC) 326    QTc 385    P Axis (Degrees) 50    R Axis  (Degrees) 67    T Axis (Degrees) -124    REPORT TEXT      Sinus rhythm  with  premature atrial complexes  ST And  T wave abnormality, consider inferior ischemia  ST And  T wave abnormality, consider anterolateral ischemia  Abnormal ECG  Confirmed by KAREN GAYTAN MD ([id number]) on 2025 3:22:53 PM         ECHO  Results for orders placed during the hospital encounter of 24    TRANSTHORACIC ECHO (TTE) COMPLETE W/ W/O IMAGING AGENT    Impression  *[facility]*  [address] [place] [address]  Phone [phone]  Fax [phone]  Transthoracic Echocardiogram (TTE)    Patient: Mik Doe      Study Date/Time:    2024 5:23AM  MRN:     [id number]                    FIN#:               [id number]  :     1953                 Ht/Wt:              149.9cm 64kg  Age:     71                         BSA/BMI:            1.65m^2 28.5kg/m^2  Gender:  F                          Baseline BP:        109 / 67  *Ordering Physician:* Parth Taylor    *Referring Physician:* Parth Taylor    *Attending Physician:* Ivan Rodriguez  *Performing Physician:* Armaan Bassett MD  *Diagnostic Physician:* Armaan Bassett MD  *Sonographer:* Cameron Pate RDCS    ------------------------------------------------------------------------------  INDICATIONS:  KAY    ------------------------------------------------------------------------------  STUDY CONCLUSIONS  SUMMARY:    1. Left ventricle: The cavity size is normal. Wall thickness is mildly  increased. There is concentric hypertrophy. Systolic function is vigorous.  The ejection fraction was measured by 3D assessment. Wall motion is normal;  there are no regional wall motion abnormalities. Cannot assess LV diastolic  function. The ejection fraction is 65%.  2. Left atrium: The atrium is mildly to moderately dilated.  3. Right ventricle: The cavity size is normal. Systolic function is normal by  visual assessment.  Systolic pressure is mildly increased. The estimated  peak pressure is 35mm Hg.  4. Inferior vena cava: The IVC is normal-sized.  5. Pericardium, extracardiac: A small, free-flowing pericardial effusion is  identified anterior to the heart. The fluid has no internal echoes. There  is no evidence of hemodynamic compromise.    ------------------------------------------------------------------------------  STUDY DATA:   Procedure:  A transthoracic echocardiogram was performed. Image  quality was adequate.  M-mode, complete 2D, 3D, complete spectral Doppler, and  color Doppler.  Study status:  Routine.  Study completion:  There were no  complications.    FINDINGS    BASELINE ECG:   Tachycardia.  LEFT VENTRICLE:  3D imaging and post-processing assessment performed. The  cavity size is normal. Wall thickness is mildly increased. There is concentric  hypertrophy. Systolic function is vigorous. Wall motion is normal; there are  no regional wall motion abnormalities. Unable to accurately assess left  ventricular diastolic function parameters due to atrial fibrillation.    The  ejection fraction was measured by 3D assessment. The ejection fraction is 65%.  Cannot assess LV diastolic function.    AORTIC VALVE:  Well visualized. The annulus is mildly calcified. The valve is  trileaflet. The leaflets are mildly thickened. Cusp separation is normal.  Mobility is not restricted. Velocity is within the normal range. There is no  stenosis. There is no significant regurgitation. The peak systolic gradient is  7mm Hg. The ratio of LVOT to aortic valve peak velocity is 0.75. The valve  area is 2.1cm^2. The valve area index is 1.29cm^2/m^2.    AORTA:  Aortic root: The root is normal-sized.    MITRAL VALVE:  The valve is structurally normal. The annulus is normal. The  leaflets are normal thickness. Leaflet separation is normal. Mobility is not  restricted. No evidence for prolapse. There is nothing to suggest chordal  rupture or a flail  leaflet. Inflow velocity is within the normal range. There  is no evidence for stenosis. There is mild regurgitation. The mean diastolic  gradient is 2mm Hg. The peak diastolic gradient is 3mm Hg. The valve area by  pressure half-time is 4.5cm^2. The valve area index by pressure half-time is  2.71cm^2/m^2. The valve area (LVOT continuity) is 2.3cm^2. The valve area  index (LVOT continuity) is 1.37cm^2/m^2.    ATRIAL SEPTUM:   Color doppler shows no obvious shunt.    LEFT ATRIUM:  The atrium is mildly to moderately dilated.    RIGHT VENTRICLE:  The cavity size is normal. Systolic function is normal by  visual assessment. The TAPSE is normal, suggestive of normal RV systolic  function.  Systolic pressure is mildly increased. The estimated peak pressure  is 35mm Hg.       The RV pressure during systole is 35mm Hg.    VENTRICULAR SEPTUM:   Thickness is mildly increased. There is no diastolic  flattening and no systolic flattening.  There is no evidence of a ventricular  septal defect.    PULMONIC VALVE:   Not well visualized. There is no significant regurgitation.  The peak systolic gradient is 3mm Hg.    TRICUSPID VALVE:  Leaflet separation is normal. There is mild regurgitation.    RIGHT ATRIUM:  Well visualized. The atrium is normal in size.       The  estimated central venous pressure is 3mm Hg.    PERICARDIUM:  A prominent pericardial fat pad is present. A small,  free-flowing pericardial effusion is identified anterior to the heart. The  fluid has no internal echoes. There is no evidence of hemodynamic compromise.    SYSTEMIC VEINS:  Inferior vena cava: The IVC is normal-sized.  Respirophasic diameter changes  are in the normal range (>= 50%).    ------------------------------------------------------------------------------  Measurements    Left ventricle             Value          Ref       06/12/2024  Aortic valve               Value          Ref       06/12/2024  JONY, LAX chord         (N) 4.3   cm       3.8 -  5.2 4.1         Leaflet sep, MM            1.6   cm       --------- 2.0  ESD, LAX chord         (L) 2.1   cm       2.2 - 3.5 2.6         Peak v, S                  1.3   m/sec    --------- 1.8  JONY/bsa, LAX chord     (N) 2.6   cm/m^2   2.3 - 3.1 2.4         Peak grad, S               7     mm Hg    --------- 12  ESD/bsa, LAX chord     (L) 1.2   cm/m^2   1.3 - 2.1 1.5         LVOT/AV, Vpeak ratio       0.75           --------- 0.89  PW, ED, LAX            (H) 1.1   cm       0.6 - 0.9 1.3         RICARDO, Vmax                  2.1   cm^2     --------- 2.8  IVS, ED                (H) 1.1   cm       0.6 - 0.9 1.2         RICARDO/bsa, Vmax              1.29  cm^2/m^2 --------- 1.64  PW, ED                 (H) 1.1   cm       0.6 - 0.9 1.3  IVS/PW, ED                 1              --------- 0.98        Mitral valve               Value          Ref       06/12/2024  EDV                    (N) 78    ml       46 - 106  69          Mean v, D                  0.56  m/sec    --------- ----------  ESV                    (L) 9     ml       14 - 42   18          Peak E                     0.85  m/sec    --------- 0.57  EF                     (N) 65    %        54 - 74   58          VTI leaflet coapt          26.4  cm       --------- ----------  SV                         69    ml       --------- 50          MiV/LVOT VTI               1.3            --------- ----------  EDV/bsa                (N) 47    ml/m^2   29 - 61   40          PHT                        49    ms       --------- ----------  ESV/bsa                (L) 5     ml/m^2   8 - 24    10          Mean grad, D               2     mm Hg    --------- ----------  SV/bsa                     41    ml/m^2   --------- 29          Peak grad, D               3     mm Hg    --------- ----------  E', lat sada, TDI       (L) 8.7   cm/sec   >=10.0    6.0         A-VTI                      26.4  cm       --------- ----------  E/e', lat sada, TDI     (N) 10             <=13      10           MVA, PHT                   4.5   cm^2     --------- ----------  E', med sada, TDI       (N) 13.1  cm/sec   >=7.0     5.0         MVA/bsa, PHT               2.71  cm^2/m^2 --------- ----------  E/e', med sada, TDI         7              --------- 11          MVA, LVOT cont             2.3   cm^2     --------- ----------  E', avg, TDI               10.9  cm/sec   --------- 5.49        MVA/bsa, LVOT cont         1.37  cm^2/m^2 --------- ----------  E/e', avg, TDI         (N) 8              <=14      10  Pulmonic valve             Value          Ref       06/12/2024  LVOT                       Value          Ref       06/12/2024  Peak v, S                  0.9   m/sec    --------- 1  Diam, S                    1.9   cm       --------- 2.0         Peak grad, S               3     mm Hg    --------- 4  Area                       2.8   cm^2     --------- 3.1  Peak cara, S                0.97  m/sec    --------- 1.57        Tricuspid valve            Value          Ref       06/12/2024  Peak grad, S               4     mm Hg    --------- 10          TR peak v              (N) 2.8   m/sec    <=2.8     2.9  Peak RV-RA grad, S         32    mm Hg    --------- 33  Right ventricle            Value          Ref       06/12/2024  JONY minor ax, A4C base (N) 2.6   cm       2.5 - 4.1 ----------  Aortic root                Value          Ref       06/12/2024  TAPSE, MM              (L) 1.6   cm       >=1.7     2.2         Root diam, ED          (N) 3.5   cm       2.4 - 3.9 3.5  Pressure, S                35    mm Hg    --------- 36          S-T junct diam, ED     (N) 2.0   cm       2.0 - 3.2 ----------  S-T junct diam/bsa, ED (N) 1.2   cm/m^2   1.1 - 1.9 ----------  Left atrium                Value          Ref       06/12/2024  AP dim, ES             (N) 3.7   cm       2.7 - 3.8 ----------  Ascending aorta            Value          Ref       06/12/2024  AP dim index, ES       (N) 2.2   cm/m^2   1.5 - 2.3 ----------  AAo AP diam, ED         (N) 3.1   cm       1.9 - 3.5 2.7  Area ES, A4C           (H) 21    cm^2     <=20      20          AAo AP diam/bsa, ED    (N) 1.9   cm/m^2   1.0 - 2.2 1.6  Area/bsa ES, A4C           12.69 cm^2/m^2 --------- 11.46  Area ES, A2C               24    cm^2     --------- 18          Pulmonary artery           Value          Ref       06/12/2024  Area/bsa ES, A2C           14.33 cm^2/m^2 --------- 10.58       Pressure, S                32    mm Hg    --------- ----------  SI dim, A2C                5.8   cm       --------- ----------  Vol, ES, 1-p A4C       (H) 63    ml       22 - 52   55          Systemic veins             Value          Ref       06/12/2024  Vol/bsa, ES, 1-p A4C   (N) 38    ml/m^2   11 - 40   32          Estimated CVP              3     mm Hg    --------- 3  Vol, ES, 1-p A2C       (H) 81    ml       22 - 52   51  Vol/bsa, ES, 1-p A2C   (H) 49    ml/m^2   13 - 40   30  Vol, ES, 2-p               72    ml       --------- 54  Vol/bsa, ES, 2-p       (H) 44    ml/m^2   16 - 34   32  Legend:  (L)  and  (H)  christian values outside specified reference range.    (N)  marks values inside specified reference range.    Electronically signed by  Christian Bassett MD  11/25/2024 08:03    Prior Signatures:    Attending Reviewed by Christian Bassett MD - 11/14/2024 7:59:40 AM      CURRENT MEDS  Current Facility-Administered Medications   Medication    [Held by provider] clopidogrel (PLAVIX) tablet 75 mg    ezetimibe (ZETIA) tablet 10 mg    [Held by provider] apixaBAN (ELIQUIS) tablet 5 mg    atorvastatin (LIPITOR) tablet 80 mg    fluticasone-vilanterol (BREO ELLIPTA) 200-25 MCG/ACT inhaler 1 puff    folic acid (FOLATE) tablet 1 mg    levothyroxine (SYNTHROID, LEVOTHROID) tablet 100 mcg    montelukast (SINGULAIR) tablet 10 mg    sodium chloride 0.9 % injection 10 mL    sodium chloride 0.9 % injection 2 mL    sodium chloride 0.9% infusion    Phosphorus Standard Replacement Protocol    ondansetron (ZOFRAN) injection 4 mg     traMADol (ULTRAM) tablet 50 mg    polyethylene glycol (MIRALAX) packet 17 g    docusate sodium-sennosides (SENOKOT S) 50-8.6 MG 2 tablet    aluminum-magnesium hydroxide-simethicone (MAALOX) 200-200-20 MG/5ML suspension 30 mL    doxycycline (VIBRAMYCIN) 100 mg in sodium chloride 0.9 % 100 mL IVPB    furosemide (LASIX INJECT) injection 40 mg    hydroCORTisone (Solu-CORTEF) PF injection 50 mg    cefepime (MAXIPIME) 1,000 mg in sodium chloride 0.9 % 100 mL IVPB    enoxaparin (LOVENOX) injection 70 mg    Potassium Standard Replacement Protocol (Levels 3.5 and lower)    Magnesium Standard Replacement Protocol    metoPROLOL succinate (TOPROL-XL) ER tablet 25 mg    mycophenolate (CELLCEPT) tablet 1,000 mg    pantoprazole (PROTONIX) EC tablet 40 mg    ipratropium-albuterol (DUONEB) 0.5-2.5 (3) MG/3ML nebulizer solution 3 mL    pramipexole (MIRAPEX) tablet 0.125 mg    acetaminophen (TYLENOL) tablet 650 mg     Facility-Administered Medications Ordered in Other Encounters   Medication    iodixanol (VISIPAQUE) 320 MG/ML injection        Julissa Arriaza MD  Internal Medicine, PGY-1     Admitted